# Patient Record
Sex: MALE | Race: WHITE
[De-identification: names, ages, dates, MRNs, and addresses within clinical notes are randomized per-mention and may not be internally consistent; named-entity substitution may affect disease eponyms.]

---

## 2018-06-21 ENCOUNTER — HOSPITAL ENCOUNTER (OUTPATIENT)
Dept: HOSPITAL 84 - D.CATH | Age: 83
Discharge: HOME | End: 2018-06-21
Attending: INTERNAL MEDICINE
Payer: MEDICARE

## 2018-06-21 VITALS — SYSTOLIC BLOOD PRESSURE: 117 MMHG | DIASTOLIC BLOOD PRESSURE: 58 MMHG

## 2018-06-21 VITALS — HEIGHT: 69 IN | BODY MASS INDEX: 28.5 KG/M2 | WEIGHT: 192.4 LBS

## 2018-06-21 DIAGNOSIS — Z01.812: ICD-10-CM

## 2018-06-21 DIAGNOSIS — I21.4: Primary | ICD-10-CM

## 2018-06-21 DIAGNOSIS — Z95.1: ICD-10-CM

## 2018-06-21 LAB
ANION GAP SERPL CALC-SCNC: 14 MMOL/L (ref 8–16)
BASOPHILS NFR BLD AUTO: 0.4 % (ref 0–2)
BUN SERPL-MCNC: 24 MG/DL (ref 7–18)
CALCIUM SERPL-MCNC: 9.3 MG/DL (ref 8.5–10.1)
CHLORIDE SERPL-SCNC: 104 MMOL/L (ref 98–107)
CO2 SERPL-SCNC: 25.2 MMOL/L (ref 21–32)
CREAT SERPL-MCNC: 1.4 MG/DL (ref 0.6–1.3)
EOSINOPHIL NFR BLD: 2.1 % (ref 0–7)
ERYTHROCYTE [DISTWIDTH] IN BLOOD BY AUTOMATED COUNT: 13.3 % (ref 11.5–14.5)
GLUCOSE SERPL-MCNC: 152 MG/DL (ref 74–106)
HCT VFR BLD CALC: 40.5 % (ref 42–54)
HGB BLD-MCNC: 14.5 G/DL (ref 13.5–17.5)
IMM GRANULOCYTES NFR BLD: 0.1 % (ref 0–5)
LYMPHOCYTES NFR BLD AUTO: 23.2 % (ref 15–50)
MCH RBC QN AUTO: 33.3 PG (ref 26–34)
MCHC RBC AUTO-ENTMCNC: 35.8 G/DL (ref 31–37)
MCV RBC: 93.1 FL (ref 80–100)
MONOCYTES NFR BLD: 7.6 % (ref 2–11)
NEUTROPHILS NFR BLD AUTO: 66.6 % (ref 40–80)
OSMOLALITY SERPL CALC.SUM OF ELEC: 284 MOSM/KG (ref 275–300)
PLATELET # BLD: 203 10X3/UL (ref 130–400)
PMV BLD AUTO: 9.8 FL (ref 7.4–10.4)
POTASSIUM SERPL-SCNC: 4.2 MMOL/L (ref 3.5–5.1)
RBC # BLD AUTO: 4.35 10X6/UL (ref 4.2–6.1)
SODIUM SERPL-SCNC: 139 MMOL/L (ref 136–145)
WBC # BLD AUTO: 7.9 10X3/UL (ref 4.8–10.8)

## 2018-10-10 ENCOUNTER — HOSPITAL ENCOUNTER (OUTPATIENT)
Dept: HOSPITAL 84 - D.CT | Age: 83
Discharge: HOME | End: 2018-10-10
Attending: INTERNAL MEDICINE
Payer: MEDICARE

## 2018-10-10 VITALS — WEIGHT: 182.38 LBS | BODY MASS INDEX: 27.01 KG/M2 | HEIGHT: 69 IN | BODY MASS INDEX: 27.01 KG/M2

## 2018-10-10 VITALS — DIASTOLIC BLOOD PRESSURE: 59 MMHG | SYSTOLIC BLOOD PRESSURE: 125 MMHG

## 2018-10-10 DIAGNOSIS — I12.9: Primary | ICD-10-CM

## 2018-10-10 DIAGNOSIS — Q61.9: ICD-10-CM

## 2018-10-10 DIAGNOSIS — R41.0: ICD-10-CM

## 2018-10-10 DIAGNOSIS — N18.3: ICD-10-CM

## 2019-06-12 ENCOUNTER — HOSPITAL ENCOUNTER (OUTPATIENT)
Dept: HOSPITAL 84 - D.HCCARDIO | Age: 84
Discharge: HOME | End: 2019-06-12
Attending: INTERNAL MEDICINE
Payer: COMMERCIAL

## 2019-06-12 VITALS — BODY MASS INDEX: 26.9 KG/M2

## 2019-06-12 DIAGNOSIS — I25.10: Primary | ICD-10-CM

## 2019-10-07 ENCOUNTER — HOSPITAL ENCOUNTER (OUTPATIENT)
Dept: HOSPITAL 84 - D.HCCARDIO | Age: 84
Discharge: HOME | End: 2019-10-07
Attending: INTERNAL MEDICINE
Payer: COMMERCIAL

## 2019-10-07 VITALS — BODY MASS INDEX: 26.9 KG/M2

## 2019-10-07 DIAGNOSIS — I25.810: Primary | ICD-10-CM

## 2019-10-22 ENCOUNTER — HOSPITAL ENCOUNTER (OUTPATIENT)
Dept: HOSPITAL 84 - D.CATH | Age: 84
Discharge: HOME | End: 2019-10-22
Attending: INTERNAL MEDICINE
Payer: COMMERCIAL

## 2019-10-22 VITALS — BODY MASS INDEX: 27.03 KG/M2 | BODY MASS INDEX: 27.03 KG/M2 | WEIGHT: 178.37 LBS | HEIGHT: 68 IN

## 2019-10-22 VITALS — SYSTOLIC BLOOD PRESSURE: 129 MMHG | DIASTOLIC BLOOD PRESSURE: 53 MMHG

## 2019-10-22 DIAGNOSIS — I25.110: Primary | ICD-10-CM

## 2019-10-22 DIAGNOSIS — R94.30: ICD-10-CM

## 2019-10-22 LAB
ALT SERPL-CCNC: 21 U/L (ref 10–68)
ANION GAP SERPL CALC-SCNC: 10.5 MMOL/L (ref 8–16)
BASOPHILS NFR BLD AUTO: 0.4 % (ref 0–2)
BUN SERPL-MCNC: 26 MG/DL (ref 7–18)
CALCIUM SERPL-MCNC: 8.5 MG/DL (ref 8.5–10.1)
CHLORIDE SERPL-SCNC: 103 MMOL/L (ref 98–107)
CHOLEST/HDLC SERPL: 4 RATIO (ref 2.3–4.9)
CO2 SERPL-SCNC: 28 MMOL/L (ref 21–32)
CREAT SERPL-MCNC: 1.2 MG/DL (ref 0.6–1.3)
EOSINOPHIL NFR BLD: 4.2 % (ref 0–7)
ERYTHROCYTE [DISTWIDTH] IN BLOOD BY AUTOMATED COUNT: 13.1 % (ref 11.5–14.5)
GLUCOSE SERPL-MCNC: 115 MG/DL (ref 74–106)
HCT VFR BLD CALC: 41.3 % (ref 42–54)
HDLC SERPL-MCNC: 54 MG/DL (ref 32–96)
HGB BLD-MCNC: 14.6 G/DL (ref 13.5–17.5)
IMM GRANULOCYTES NFR BLD: 0 % (ref 0–5)
LDL-HDL RATIO: 2.9 RATIO (ref 1.5–3.5)
LDLC SERPL-MCNC: 156 MG/DL (ref 0–100)
LYMPHOCYTES NFR BLD AUTO: 30.8 % (ref 15–50)
MCH RBC QN AUTO: 34.4 PG (ref 26–34)
MCHC RBC AUTO-ENTMCNC: 35.4 G/DL (ref 31–37)
MCV RBC: 97.2 FL (ref 80–100)
MONOCYTES NFR BLD: 10.3 % (ref 2–11)
NEUTROPHILS NFR BLD AUTO: 54.3 % (ref 40–80)
OSMOLALITY SERPL CALC.SUM OF ELEC: 279 MOSM/KG (ref 275–300)
PLATELET # BLD: 139 10X3/UL (ref 130–400)
PMV BLD AUTO: 9.6 FL (ref 7.4–10.4)
POTASSIUM SERPL-SCNC: 4.5 MMOL/L (ref 3.5–5.1)
RBC # BLD AUTO: 4.25 10X6/UL (ref 4.2–6.1)
SODIUM SERPL-SCNC: 137 MMOL/L (ref 136–145)
TRIGL SERPL-MCNC: 36 MG/DL (ref 30–200)
WBC # BLD AUTO: 5.1 10X3/UL (ref 4.8–10.8)

## 2019-10-22 NOTE — NUR
1615 DRESSING REMAINS CDI LEFT GROIN, AREA IS SOFT AND NONTENDER,
PEDAL PULSES PALPABLE. PT HAS [place] SANDWICH WITH NO C/O NAUSEA.
DC INSTRUCTIONS REVIEWED WITH PT AND SON WHO VEBALIZE UNDERSTANDING.
IV DC'D WITH CATH INTACT. PT HAS VOIDED 525 CC CLEAR YELLOW URINE TO
URINAL.

## 2019-10-22 NOTE — NUR
DRESSING REMAINS CDI LEFT GROIN, AREA IS SOFT AND NONTENDER. PEDAL
PULSES PALPABLE. HOB IS FULLY ELEVATED. PT IS ALERT AND DENIES ANY
C[facility] [place] SANDWICH AND PO FLUIDS WITH NO C/O
NAUSEA.

## 2019-10-22 NOTE — NUR
HOB IS FLAT, PT IS AWAKE AND DENIES ANY C/O. DRESSING CDI LEFT GROIN,
AREA IS SOFT AND NONTENDER. PEDAL PULSES PALPABLE. VSS, SON AT
BEDSIDE.

## 2019-10-22 NOTE — NUR
1630 PT DRESSED FOR DISCHARGE WITH ASSIST FROM NURSE AND SON,
DDRESSING REMAINS CDI LEFT GROIN, PEDAL PULSES PALPABLE. PT DENIES
ANY NV DEFICIT TO LLE. IS ALERT AND DENIES ANY C/O.
PT ESCORTED TO PRIVATE AUTO VIA WC BY NURSE WITH SON DRIVING HIM
HOME.

## 2019-10-22 NOTE — NUR
DRESSING CDI LEFT GROIN, AREA IS SOFT AND NONTENDER. PEDAL PULSES
PALPABLE. PT DENIES ANY C/O. HOB IS FLAT, SON AT BEDSIDE.
NSR, RATE IS 60, BP /60.

## 2019-10-22 NOTE — NUR
HOB ELEVATED 30 DEGREES, DRESSING CDI LEFT GROIN, PEDAL PULSES
PALPABLE. PT ALERT AND DENIES ANY C/O. VSS, SON AT BEDSIDE. PO FLUIDS
AND SANDWICH SERVED.

## 2022-06-13 NOTE — NUR
[address] [facility] [address] [facility]. [address]  [facility] [address]  Phone: [phone]  Fax: David Tee, DO        June 13, 2022     [id number] [facility] [address]      Dear Kusum Ambrose:    We missed seeing you for a scheduled appointment at [place] [address] with Richard Roman DO on 06/10/2022. We're sorry you were unable to keep your appointment and hope that you are doing well. We ask that you please call 24 hours in advance if you are unable to make your appointment, so that we can give that time to another patient in need. We care about you and the management of your healthcare and want to make sure that you follow up as recommended. To provide quality care and timely appointments to all our patients, you may be dismissed from the practice if you do not show for three (3) scheduled appointments within a 12-month period. We would like to continue treating your healthcare needs. Please call the office to reschedule your appointment, if needed.      Sincerely,        Atiya Pink DO DR BAPTISTE HAS ROUNDED ON PT. PT DENIES ANY C/O. DRESSING IS CDI AND
PEDAL PULSES PALPABLE. NSR, RATE IS 63, BP /66. HOB IS FLAT, PT
 
INSTRUCTED TO KEEP HEAD FLAT TO PILLOW AND RIGHT LEG STRAIGHT AND
VERBALIZES UNDERSTANDING. BED IS LOCKED AND LOW, SIDE RAILS UP X2,
SON AT BEDSIDE, CALL LIGHT IN REACH.